# Patient Record
Sex: FEMALE | Race: WHITE | NOT HISPANIC OR LATINO | Employment: FULL TIME | URBAN - METROPOLITAN AREA
[De-identification: names, ages, dates, MRNs, and addresses within clinical notes are randomized per-mention and may not be internally consistent; named-entity substitution may affect disease eponyms.]

---

## 2018-02-23 ENCOUNTER — OFFICE VISIT (OUTPATIENT)
Dept: URGENT CARE | Facility: CLINIC | Age: 23
End: 2018-02-23
Payer: COMMERCIAL

## 2018-02-23 VITALS
WEIGHT: 165 LBS | BODY MASS INDEX: 30.36 KG/M2 | DIASTOLIC BLOOD PRESSURE: 80 MMHG | TEMPERATURE: 99.9 F | SYSTOLIC BLOOD PRESSURE: 136 MMHG | HEIGHT: 62 IN | HEART RATE: 70 BPM | OXYGEN SATURATION: 100 % | RESPIRATION RATE: 18 BRPM

## 2018-02-23 DIAGNOSIS — M25.512 ACUTE PAIN OF LEFT SHOULDER: Primary | ICD-10-CM

## 2018-02-23 PROCEDURE — 99203 OFFICE O/P NEW LOW 30 MIN: CPT | Performed by: PHYSICIAN ASSISTANT

## 2018-02-23 NOTE — PROGRESS NOTES
Assessment/Plan:      Diagnoses and all orders for this visit:    Acute pain of left shoulder  -     Ambulatory referral to Orthopedic Surgery; Future        Patient Instructions   Up to 2 tablets of aleve twice a day  Alternate ice and heat application at least 4 times a day  Gentle stretching and massage    Subjective:    Chief Complaint   Patient presents with    Shoulder Pain     started 1 month ago      Patient ID: Austyn Vasques is a 25 y o  female  Shoulder Pain    The pain is present in the left shoulder  This is a new problem  Episode onset: 1 mo  There has been no history of extremity trauma  The problem occurs constantly  The problem has been gradually worsening  The pain is moderate  Pertinent negatives include no fever, inability to bear weight, itching, joint locking, joint swelling, limited range of motion, numbness, stiffness or tingling  The symptoms are aggravated by lying down  She has tried acetaminophen for the symptoms  The treatment provided mild relief  Family history does not include gout or rheumatoid arthritis  There is no history of diabetes, gout, osteoarthritis or rheumatoid arthritis  Review of Systems   Constitutional: Negative for fever  Respiratory: Negative  Cardiovascular: Negative  Musculoskeletal: Positive for arthralgias and myalgias  Negative for back pain, gait problem, gout, joint swelling, neck pain, neck stiffness and stiffness  Skin: Negative  Negative for itching  Neurological: Negative for tingling and numbness  Objective:    /80 (BP Location: Right arm, Patient Position: Sitting, Cuff Size: Standard)   Pulse 70   Temp 99 9 °F (37 7 °C) (Tympanic)   Resp 18   Ht 5' 2" (1 575 m)   Wt 74 8 kg (165 lb)   LMP 01/30/2018 (Approximate)   SpO2 100%   BMI 30 18 kg/m²      Physical Exam   Constitutional: She is oriented to person, place, and time  She appears well-developed and well-nourished  No distress     Cardiovascular: Normal rate, regular rhythm, normal heart sounds and intact distal pulses  Exam reveals no gallop and no friction rub  No murmur heard  Pulmonary/Chest: Effort normal and breath sounds normal  No respiratory distress  She has no wheezes  She has no rales  She exhibits no tenderness  Abdominal: Soft  Bowel sounds are normal  She exhibits no distension and no mass  There is no tenderness  There is no rebound and no guarding  Musculoskeletal:        Left shoulder: She exhibits pain  She exhibits normal range of motion, no tenderness, no bony tenderness, no swelling, no effusion, no crepitus, no deformity, no laceration, no spasm, normal pulse and normal strength  Neurological: She is alert and oriented to person, place, and time  She displays normal reflexes  No cranial nerve deficit  She exhibits normal muscle tone   Coordination normal

## 2018-02-23 NOTE — PATIENT INSTRUCTIONS
Up to 2 tablets of aleve twice a day  Alternate ice and heat application at least 4 times a day  Gentle stretching and massage

## 2020-08-07 ENCOUNTER — OFFICE VISIT (OUTPATIENT)
Dept: URGENT CARE | Facility: CLINIC | Age: 25
End: 2020-08-07
Payer: COMMERCIAL

## 2020-08-07 VITALS
HEART RATE: 78 BPM | BODY MASS INDEX: 35.88 KG/M2 | HEIGHT: 62 IN | WEIGHT: 195 LBS | SYSTOLIC BLOOD PRESSURE: 129 MMHG | OXYGEN SATURATION: 99 % | DIASTOLIC BLOOD PRESSURE: 80 MMHG | TEMPERATURE: 99 F | RESPIRATION RATE: 16 BRPM

## 2020-08-07 DIAGNOSIS — L03.011 PARONYCHIA OF RIGHT INDEX FINGER: Primary | ICD-10-CM

## 2020-08-07 PROCEDURE — 99212 OFFICE O/P EST SF 10 MIN: CPT | Performed by: EMERGENCY MEDICINE

## 2020-08-07 RX ORDER — NORGESTREL-ETHINYL ESTRADIOL 0.3-0.03MG
1 TABLET ORAL DAILY
COMMUNITY
Start: 2020-05-27

## 2020-08-07 RX ORDER — VENLAFAXINE HYDROCHLORIDE 75 MG/1
75 CAPSULE, EXTENDED RELEASE ORAL DAILY
COMMUNITY
Start: 2020-05-27

## 2020-08-07 RX ORDER — CLINDAMYCIN HYDROCHLORIDE 150 MG/1
150 CAPSULE ORAL EVERY 8 HOURS SCHEDULED
Qty: 21 CAPSULE | Refills: 0 | Status: SHIPPED | OUTPATIENT
Start: 2020-08-07 | End: 2020-08-14

## 2020-08-07 NOTE — PROGRESS NOTES
[facility]        NAME: Asher Bowser is a 22 y o  female  : 1995    MRN: [id number]  DATE: 2020  TIME: 9:02 AM    Assessment and Plan   Paronychia of right index finger [L03 011]  1  Paronychia of right index finger  clindamycin (CLEOCIN) 150 mg capsule         Patient Instructions     Patient Instructions   Paronychia   WHAT YOU NEED TO KNOW:   Paronychia is an infection of your nail fold caused by bacteria or a fungus  The nail fold is the skin around your nail  Paronychia may happen suddenly and last for 6 weeks or longer  You may have paronychia on more than 1 finger or toe  DISCHARGE INSTRUCTIONS:   Medicines:   · Td vaccine  is a booster shot used to help prevent tetanus and diphtheria  The Td booster may be given to adolescents and adults every 10 years or for certain wounds and injuries  · Antibiotics: This medicine will help fight or prevent an infection  It may be given as a pill, cream, or ointment  · Steroids: This medicine will help decrease inflammation  It may be given as a pill, cream, or ointment  · Antifungal medicine: This medicine helps kill fungus that may be causing your infection  It may be given as a cream or ointment  · NSAIDs:  These medicines decrease pain and swelling  NSAIDs are available without a doctor's order  Ask your healthcare provider which medicine is right for you  Ask how much to take and when to take it  Take as directed  NSAIDs can cause stomach bleeding and kidney problems if not taken correctly  · Take your medicine as directed  Contact your healthcare provider if you think your medicine is not helping or if you have side effects  Tell him of her if you are allergic to any medicine  Keep a list of the medicines, vitamins, and herbs you take  Include the amounts, and when and why you take them  Bring the list or the pill bottles to follow-up visits  Carry your medicine list with you in case of an emergency    Follow up with your healthcare provider as directed:  Write down your questions so you remember to ask them during your visits  Self-care:   · Soak your nail:  Soak your nail in a mixture of equal parts vinegar and water 3 or 4 times each day  This will help decrease inflammation  · Apply a warm compress:  Soak a washcloth in warm water and place it on your nail  This will help decrease inflammation  · Elevate:  Raise your nail above the level of your heart as often as you can  This will help decrease swelling and pain  Prop your nail on pillows or blankets to keep it elevated comfortably  · Use lotion:  Apply lotion after you wash your hands  This will prevent your skin from becoming too dry  Prevent paronychia:   · Avoid chemicals and allergens that may harm your skin and nails  This includes soaps, laundry detergents, and nail products  · Keep your nails clean and dry  Avoid soaking your nails in water  Use cotton-lined rubber gloves or wear 2 rubber gloves if you work with food or water  The gloves will help protect your nail folds  · Keep your nails short  Do not bite your nails, pick at your hangnails, suck your fingers, or wear fake nails  Bring your own nail tools when you go to the nail salon  Contact your healthcare provider if:   · Your nail becomes loose, deformed, or falls off  · You have a large abscess on your nail  · You have questions or concerns about your condition or care  Return to the emergency department if:   · You have severe nail pain  · The inflammation spreads to your hand or arm  © 2017 [address] Information is for End User's use only and may not be sold, redistributed or otherwise used for commercial purposes  All illustrations and images included in CareNotes® are the copyrighted property of [employer]  or [employer]  The above information is an  only   It is not intended as medical advice for individual conditions or treatments  Talk to your doctor, nurse or pharmacist before following any medical regimen to see if it is safe and effective for you  Follow up with PCP in 3-5 days  Proceed to  ER if symptoms worsen  Chief Complaint     Chief Complaint   Patient presents with   Trisha Shaffer     was picking at cuticle on right 2nd finger and now it is red and swollen around the nailbed         History of Present Illness       Patient complains of increasing redness swelling and pain adjacent to right index finger nail over the past 2 days  Onset after she was picking at her cuticle  Review of Systems   Review of Systems   Constitutional: Negative for activity change, chills and fever  Musculoskeletal: Negative for arthralgias, back pain, joint swelling, myalgias, neck pain and neck stiffness  Skin: Positive for color change and wound  Neurological: Negative for dizziness and headaches  Current Medications       Current Outpatient Medications:     Cryselle-28 0 3-30 MG-MCG per tablet, Take 1 tablet by mouth daily, Disp: , Rfl:     venlafaxine (EFFEXOR-XR) 75 mg 24 hr capsule, Take 75 mg by mouth daily, Disp: , Rfl:     clindamycin (CLEOCIN) 150 mg capsule, Take 1 capsule (150 mg total) by mouth every 8 (eight) hours for 7 days, Disp: 21 capsule, Rfl: 0    Current Allergies     Allergies as of 08/07/2020    (No Known Allergies)            The following portions of the patient's history were reviewed and updated as appropriate: allergies, current medications, past family history, past medical history, past social history, past surgical history and problem list      Past Medical History:   Diagnosis Date    Known health problems: none        Past Surgical History:   Procedure Laterality Date    NO PAST SURGERIES         Family History   Problem Relation Age of Onset    No Known Problems Mother     No Known Problems Father          Medications have been verified          Objective   /80 Pulse 78   Temp 99 °F (37 2 °C) (Tympanic)   Resp 16   Ht 5' 2" (1 575 m)   Wt 88 5 kg (195 lb)   LMP 07/28/2020   SpO2 99%   BMI 35 67 kg/m²        Physical Exam     Physical Exam   Constitutional: She is oriented to person, place, and time  She appears well-developed  HENT:   Head: Normocephalic and atraumatic  Neck: Normal range of motion  Neck supple  Musculoskeletal:         General: No tenderness  Neurological: She is alert and oriented to person, place, and time  Skin: Skin is warm and dry  No rash noted  There is erythema  Tender mass with erythema lateral to right index finger nail margin, no I and D able lesion at present  Psychiatric: Her behavior is normal  Judgment and thought content normal    Nursing note and vitals reviewed

## 2020-08-07 NOTE — PATIENT INSTRUCTIONS
Paronychia   WHAT YOU NEED TO KNOW:   Paronychia is an infection of your nail fold caused by bacteria or a fungus  The nail fold is the skin around your nail  Paronychia may happen suddenly and last for 6 weeks or longer  You may have paronychia on more than 1 finger or toe  DISCHARGE INSTRUCTIONS:   Medicines:   · Td vaccine  is a booster shot used to help prevent tetanus and diphtheria  The Td booster may be given to adolescents and adults every 10 years or for certain wounds and injuries  · Antibiotics: This medicine will help fight or prevent an infection  It may be given as a pill, cream, or ointment  · Steroids: This medicine will help decrease inflammation  It may be given as a pill, cream, or ointment  · Antifungal medicine: This medicine helps kill fungus that may be causing your infection  It may be given as a cream or ointment  · NSAIDs:  These medicines decrease pain and swelling  NSAIDs are available without a doctor's order  Ask your healthcare provider which medicine is right for you  Ask how much to take and when to take it  Take as directed  NSAIDs can cause stomach bleeding and kidney problems if not taken correctly  · Take your medicine as directed  Contact your healthcare provider if you think your medicine is not helping or if you have side effects  Tell him of her if you are allergic to any medicine  Keep a list of the medicines, vitamins, and herbs you take  Include the amounts, and when and why you take them  Bring the list or the pill bottles to follow-up visits  Carry your medicine list with you in case of an emergency  Follow up with your healthcare provider as directed:  Write down your questions so you remember to ask them during your visits  Self-care:   · Soak your nail:  Soak your nail in a mixture of equal parts vinegar and water 3 or 4 times each day  This will help decrease inflammation      · Apply a warm compress:  Soak a washcloth in warm water and place it on your nail  This will help decrease inflammation  · Elevate:  Raise your nail above the level of your heart as often as you can  This will help decrease swelling and pain  Prop your nail on pillows or blankets to keep it elevated comfortably  · Use lotion:  Apply lotion after you wash your hands  This will prevent your skin from becoming too dry  Prevent paronychia:   · Avoid chemicals and allergens that may harm your skin and nails  This includes soaps, laundry detergents, and nail products  · Keep your nails clean and dry  Avoid soaking your nails in water  Use cotton-lined rubber gloves or wear 2 rubber gloves if you work with food or water  The gloves will help protect your nail folds  · Keep your nails short  Do not bite your nails, pick at your hangnails, suck your fingers, or wear fake nails  Bring your own nail tools when you go to the nail salon  Contact your healthcare provider if:   · Your nail becomes loose, deformed, or falls off  · You have a large abscess on your nail  · You have questions or concerns about your condition or care  Return to the emergency department if:   · You have severe nail pain  · The inflammation spreads to your hand or arm  © 2017 [facility] Information is for End User's use only and may not be sold, redistributed or otherwise used for commercial purposes  All illustrations and images included in CareNotes® are the copyrighted property of [employer]  or [employer]  The above information is an  only  It is not intended as medical advice for individual conditions or treatments  Talk to your doctor, nurse or pharmacist before following any medical regimen to see if it is safe and effective for you

## 2020-09-22 ENCOUNTER — OFFICE VISIT (OUTPATIENT)
Dept: URGENT CARE | Facility: CLINIC | Age: 25
End: 2020-09-22
Payer: COMMERCIAL

## 2020-09-22 VITALS
HEART RATE: 75 BPM | RESPIRATION RATE: 18 BRPM | HEIGHT: 62 IN | OXYGEN SATURATION: 98 % | BODY MASS INDEX: 34.96 KG/M2 | WEIGHT: 190 LBS | TEMPERATURE: 98 F

## 2020-09-22 DIAGNOSIS — J06.9 VIRAL URI: Primary | ICD-10-CM

## 2020-09-22 LAB — S PYO AG THROAT QL: NEGATIVE

## 2020-09-22 PROCEDURE — 87880 STREP A ASSAY W/OPTIC: CPT | Performed by: PHYSICIAN ASSISTANT

## 2020-09-22 PROCEDURE — U0003 INFECTIOUS AGENT DETECTION BY NUCLEIC ACID (DNA OR RNA); SEVERE ACUTE RESPIRATORY SYNDROME CORONAVIRUS 2 (SARS-COV-2) (CORONAVIRUS DISEASE [COVID-19]), AMPLIFIED PROBE TECHNIQUE, MAKING USE OF HIGH THROUGHPUT TECHNOLOGIES AS DESCRIBED BY CMS-2020-01-R: HCPCS | Performed by: PHYSICIAN ASSISTANT

## 2020-09-22 PROCEDURE — 87070 CULTURE OTHR SPECIMN AEROBIC: CPT | Performed by: PHYSICIAN ASSISTANT

## 2020-09-22 PROCEDURE — 99213 OFFICE O/P EST LOW 20 MIN: CPT | Performed by: PHYSICIAN ASSISTANT

## 2020-09-22 NOTE — LETTER
September 22, 2020     Patient: Asher Bowser   YOB: 1995   Date of Visit: 9/22/2020       To Whom It May Concern: It is my medical opinion that Asher Bowser Should remain out of work for 10 days since symptom onset or 24 hours fever free without the use of fever reducing drugs, whichever is longer AND overall general improvement in symptoms OR 14 days since last exposure OR negative results  If you have any questions or concerns, please don't hesitate to call           Sincerely,        Sally Camacho PA-C

## 2020-09-22 NOTE — PATIENT INSTRUCTIONS
Covid 19 results will return in a 3-5 days  We will call you with both negative or positive results  Prophylactically self quarantine  Department of health's newest recommendations state patient should self quarantine for 10 days since symptom onset or 24 hours fever free without the use of fever reducing drugs (Tylenol and ibuprofen), whichever is longer AND overall improvement of symptoms  Drink lots of fluids to maintain hydration  Do not touch your face, wash hands often, and practice social distancing  Call your PCP if you have any questions or concerns  Go to ER if having severe symptoms such as chest pain, shortness of breath, or fever that is not responding to antipyretics  Novel Coronavirus   WHAT YOU NEED TO KNOW:   The nCoV is a new strain (type) of coronavirus that can cause severe respiratory (lung) infection  DISCHARGE INSTRUCTIONS:   Call your local emergency number (911 in the [address] [place] [address],[address]) for any of the following:  Tell the  you may have nCoV  This will help anyone in the ambulance stay safe, and to call ahead to the hospital   · You have sudden shortness of breath  · You have a fast heartbeat or chest pain  Return to the emergency department if:   · You feel so dizzy that you have trouble standing up  · Your lips and fingernails turn blue  Call your doctor if:   · You have a fever over 102ºF (39ºC)  · Your sore throat gets worse or you see white or yellow spots in your throat  · Your symptoms get worse after 3 to 5 days or your cold is not better in 14 days  · You have a rash anywhere on your skin  · You have large, tender lumps in your neck  · You have thick, green, or yellow drainage from your nose  · You cough up thick yellow, green, or bloody mucus  · You are vomiting for more than 24 hours and cannot keep fluids down  · You have a bad earache  · You have questions or concerns about your condition or care  Medicines:   You may need any of the following:  · Decongestants  help reduce nasal congestion and help you breathe more easily  If you take decongestant pills, they may make you feel restless or cause problems with your sleep  Do not use decongestant sprays for more than a few days  · Cough suppressants  help reduce coughing  Ask your healthcare provider which type of cough medicine is best for you  · NSAIDs , such as ibuprofen, help decrease swelling, pain, and fever  NSAIDs can cause stomach bleeding or kidney problems in certain people  If you take blood thinner medicine, always ask your healthcare provider if NSAIDs are safe for you  Always read the medicine label and follow directions  · Acetaminophen  decreases pain and fever  It is available without a doctor's order  Ask how much to take and how often to take it  Follow directions  Read the labels of all other medicines you are using to see if they also contain acetaminophen, or ask your doctor or pharmacist  Acetaminophen can cause liver damage if not taken correctly  Do not use more than 4 grams (4,000 milligrams) total of acetaminophen in one day  · Take your medicine as directed  Contact your healthcare provider if you think your medicine is not helping or if you have side effects  Tell him or her if you are allergic to any medicine  Keep a list of the medicines, vitamins, and herbs you take  Include the amounts, and when and why you take them  Bring the list or the pill bottles to follow-up visits  Carry your medicine list with you in case of an emergency  Help relieve mild symptoms:   · Drink more liquids as directed  Liquids will help thin and loosen mucus so you can cough it up  Liquids will also help prevent dehydration  Liquids that help prevent dehydration include water, fruit juice, and broth  Do not drink liquids that contain caffeine  Caffeine can increase your risk for dehydration  Ask your healthcare provider how much liquid to drink each day      · Soothe a sore throat  Gargle with warm salt water  This helps your sore throat feel better  Make salt water by dissolving ¼ teaspoon salt in 1 cup warm water  You may also suck on hard candy or throat lozenges  You may use a sore throat spray  · Use a humidifier or vaporizer  Use a cool mist humidifier or a vaporizer to increase air moisture in your home  This may make it easier for you to breathe and help decrease your cough  · Use saline nasal drops as directed  These help relieve congestion  · Apply petroleum-based jelly around the outside of your nostrils  This can decrease irritation from blowing your nose  · Do not smoke  Nicotine and other chemicals in cigarettes and cigars can make your symptoms worse  They can also cause infections such as bronchitis or pneumonia  Ask your healthcare provider for information if you currently smoke and need help to quit  E-cigarettes or smokeless tobacco still contain nicotine  Talk to your healthcare provider before you use these products  Prevent the spread of nCoV:  If you are around anyone who has nCoV, the following can help you protect you from infection  Have the person follow the guidelines to prevent infecting others:  · Limit close contact with others  Anyone with nCoV should stay in a different room, or sleep in a separate bed  Others need to stay at least 6 feet (2 meters) away  The person should only go out of the house for medical appointments  He or she should always call the office of any healthcare provider  The provider will need to prepare to keep others safe  · Wear a medical mask when around others  You can wear a medical mask or have the person wear one  A medical mask will help prevent nCoV from spreading  · Cover your mouth and nose to sneeze or cough  Everyone should always cover a sneeze or cough  Use a tissue that covers the mouth and nose  Use the bend of our arm if a tissue is not available   Throw the tissue away in a trash can right away  Then wash your hands well with soap and water  Use hand  that contains alcohol if you cannot wash your hands  · Wash your hands often  You and everyone in your home must wash your hands throughout the day  Use soap and water  Use germ-killing gel if soap and water are not available  A person with nCoV should not touch his or her eyes or mouth without washing his or her hands first          · Do not share items  Do not share dishes, towels, or other items with anyone  Always wash items after a person who has nCoV uses them  · Clean surfaces often  Use household  or bleach diluted with water to clean counters, doorknobs, toilet seats, and other surfaces  · Do not handle live animals  The nCoV may be spread to animals, including pets  Until more is known, it is best for a person with nCoV not to touch, play with, or handle live animals  Follow up with your doctor as directed:  Write down your questions so you remember to ask them during your visits  © Copyright [address] Information is for End User's use only and may not be sold, redistributed or otherwise used for commercial purposes  All illustrations and images included in CareNotes® are the copyrighted property of A [employer] A M [employer] Inc  or [address] [place], [place] [address]pa[facility]  The above information is an  only  It is not intended as medical advice for individual conditions or treatments  Talk to your doctor, nurse or pharmacist before following any medical regimen to see if it is safe and effective for you

## 2020-09-22 NOTE — PROGRESS NOTES
[address]        NAME: Marcy Harris is a 26 y o  female  : 1995    MRN: [id number]  DATE: 2020  TIME: 9:25 AM    Assessment and Plan   Viral URI [J06 9]  1  Viral URI  Novel Coronavirus (COVID-19), PCR [facility] - Office Collection    POCT rapid strepA    Throat culture           Patient evaluated and tested in COVID-19 designated room  Patient Instructions   Over-the-counter Zyrtec and Flonase  Covid 19 results will return in a 3-5 days  We will call you with both negative or positive results  Prophylactically self quarantine  Department of health's newest recommendations state patient should self quarantine for 10 days since symptom onset or 24 hours fever free without the use of fever reducing drugs (Tylenol and ibuprofen), whichever is longer AND overall improvement of symptoms  Drink lots of fluids to maintain hydration  Do not touch your face, wash hands often, and practice social distancing  Call your PCP if you have any questions or concerns  Go to ER if having severe symptoms such as chest pain, shortness of breath, or fever that is not responding to antipyretics  Follow up with PCP in 3-5 days  Proceed to  ER if symptoms worsen  Chief Complaint     Chief Complaint   Patient presents with    COVID-19     Runny nose, sore throat starting yesterday  Denies fever, cough, SOB  No known COVID exposure  History of Present Illness       Patient is a 69-year-old female with no significant past medical history presents to the office complaining of rhinorrhea, congestion, and sore throat for 1 day  She denies fever, chills, cough, SOB, CP, difficulty breathing, dysphagia, anosmia, dysgeusia, or weakness  She has not been using anything for her symptoms  Denies contact with positive COVID-19  Review of Systems   Review of Systems   Constitutional: Negative for chills and fever  HENT: Positive for postnasal drip, rhinorrhea and sore throat  Negative for congestion  Respiratory: Negative for cough and shortness of breath  Cardiovascular: Negative for chest pain and palpitations  Gastrointestinal: Negative for abdominal pain, diarrhea, nausea and vomiting  Musculoskeletal: Negative for myalgias  Neurological: Negative for dizziness, light-headedness and headaches  Current Medications       Current Outpatient Medications:     Cryselle-28 0 3-30 MG-MCG per tablet, Take 1 tablet by mouth daily, Disp: , Rfl:     venlafaxine (EFFEXOR-XR) 75 mg 24 hr capsule, Take 75 mg by mouth daily, Disp: , Rfl:     Current Allergies     Allergies as of 09/22/2020    (No Known Allergies)            The following portions of the patient's history were reviewed and updated as appropriate: allergies, current medications, past family history, past medical history, past social history, past surgical history and problem list      Past Medical History:   Diagnosis Date    Known health problems: none        Past Surgical History:   Procedure Laterality Date    TONSILLECTOMY         Family History   Problem Relation Age of Onset    No Known Problems Mother     No Known Problems Father          Medications have been verified  Objective   Pulse 75   Temp 98 °F (36 7 °C)   Resp 18   Ht 5' 2" (1 575 m)   Wt 86 2 kg (190 lb)   LMP 09/17/2020 (Exact Date)   SpO2 98%   BMI 34 75 kg/m²        Physical Exam     Physical Exam  Vitals signs and nursing note reviewed  Constitutional:       Appearance: Normal appearance  She is well-developed  HENT:      Head: Normocephalic and atraumatic  Right Ear: Tympanic membrane, ear canal and external ear normal       Left Ear: Tympanic membrane, ear canal and external ear normal       Nose: Congestion and rhinorrhea present  Mouth/Throat:      Lips: Pink  Mouth: Mucous membranes are moist       Pharynx: Uvula midline  Posterior oropharyngeal erythema present   No pharyngeal swelling, oropharyngeal exudate or uvula swelling  Tonsils: No tonsillar exudate or tonsillar abscesses  Eyes:      General: Lids are normal       Conjunctiva/sclera: Conjunctivae normal       Pupils: Pupils are equal, round, and reactive to light  Neck:      Musculoskeletal: Neck supple  Cardiovascular:      Rate and Rhythm: Normal rate and regular rhythm  Pulses: Normal pulses  Heart sounds: Normal heart sounds  No murmur  No friction rub  No gallop  Pulmonary:      Effort: Pulmonary effort is normal       Breath sounds: Normal breath sounds  No wheezing, rhonchi or rales  Chest:      Chest wall: No tenderness  Musculoskeletal: Normal range of motion  Lymphadenopathy:      Cervical: No cervical adenopathy  Skin:     General: Skin is warm and dry  Capillary Refill: Capillary refill takes less than 2 seconds  Neurological:      Mental Status: She is alert  Rapid strep negative

## 2020-09-23 LAB — SARS-COV-2 RNA SPEC QL NAA+PROBE: NOT DETECTED

## 2020-09-24 LAB — BACTERIA THROAT CULT: NORMAL

## 2022-12-02 ENCOUNTER — OFFICE VISIT (OUTPATIENT)
Dept: URGENT CARE | Facility: CLINIC | Age: 27
End: 2022-12-02

## 2022-12-02 VITALS
HEART RATE: 98 BPM | SYSTOLIC BLOOD PRESSURE: 137 MMHG | TEMPERATURE: 97.1 F | WEIGHT: 200 LBS | HEIGHT: 62 IN | OXYGEN SATURATION: 98 % | BODY MASS INDEX: 36.8 KG/M2 | DIASTOLIC BLOOD PRESSURE: 79 MMHG | RESPIRATION RATE: 18 BRPM

## 2022-12-02 DIAGNOSIS — U07.1 COVID-19: Primary | ICD-10-CM

## 2022-12-02 LAB
SARS-COV-2 AG UPPER RESP QL IA: POSITIVE
VALID CONTROL: ABNORMAL

## 2022-12-02 NOTE — PROGRESS NOTES
[address]        NAME: Janalyn Paget is a 33 y o  female  : 1995    MRN: [id number]  DATE: 2022  TIME: 12:39 PM    Assessment and Plan   COVID-19 [U07 1]  1  COVID-19  Poct Covid 19 Rapid Antigen Test            Patient Instructions     Patient Instructions     Take an expectorant - guaifenesin should be the only ingredient - during the day, and the cough suppressant (ex  Robitussin DM or Tessalon) if needed at night only  Take Zinc 50 mg every 12 hours for the next week  You should also take Quercetin 500 mg twice daily with it  You should also take vitamin D3 5000 i u s per day for the next 1 week, and vitamin-C 1 g twice daily  You may use Flonase as discussed  You may also take a decongestant like Sudafed, unless you have hypertension or cardiac disease  If you are diabetic you should adhere strictly to your diabetic diet and monitor blood sugar closely while on prednisone and you should discontinue the prednisone if blood sugar becomes significantly elevated  Avoid nonsteroidal anti-inflammatories like Advil or Aleve while on prednisone  [address]     Your healthcare provider and/or public health staff have evaluated you and have determined that you do not need to be hospitalized at this time  At this time you can be isolated at home where you will be monitored by staff from your local or state health department  You should carefully follow the prevention and isolation steps below until a healthcare provider or local or state health department says that you can return to your normal activities  Stay home except to get medical care     People who are mildly ill with COVID-19 are able to isolate at home during their illness  You should restrict activities outside your home, except for getting medical care  Do not go to work, school, or public areas  Avoid using public transportation, ride-sharing, or taxis       Separate yourself from other people and animals in your home     People: As much as possible, you should stay in a specific room and away from other people in your home  Also, you should use a separate bathroom, if available  Animals: You should restrict contact with pets and other animals while you are sick with COVID-19, just like you would around other people  Although there have not been reports of pets or other animals becoming sick with COVID-19, it is still recommended that people sick with COVID-19 limit contact with animals until more information is known about the virus  When possible, have another member of your household care for your animals while you are sick  If you are sick with COVID-19, avoid contact with your pet, including petting, snuggling, being kissed or licked, and sharing food  If you must care for your pet or be around animals while you are sick, wash your hands before and after you interact with pets and wear a facemask  See COVID-19 and Animals for more information  Call ahead before visiting your doctor     If you have a medical appointment, call the healthcare provider and tell them that you have or may have COVID-19  This will help the healthcare provider's office take steps to keep other people from getting infected or exposed  Wear a facemask     You should wear a facemask when you are around other people (e g , sharing a room or vehicle) or pets and before you enter a healthcare provider's office  If you are not able to wear a facemask (for example, because it causes trouble breathing), then people who live with you should not stay in the same room with you, or they should wear a facemask if they enter your room  Cover your coughs and sneezes     Cover your mouth and nose with a tissue when you cough or sneeze  Throw used tissues in a lined trash can   Immediately wash your hands with soap and water for at least 20 seconds or, if soap and water are not available, clean your hands with an alcohol-based hand  that contains at least 60% alcohol  Clean your hands often     Wash your hands often with soap and water for at least 20 seconds, especially after blowing your nose, coughing, or sneezing; going to the bathroom; and before eating or preparing food  If soap and water are not readily available, use an alcohol-based hand  with at least 60% alcohol, covering all surfaces of your hands and rubbing them together until they feel dry  Soap and water are the best option if hands are visibly dirty  Avoid touching your eyes, nose, and mouth with unwashed hands  Avoid sharing personal household items     You should not share dishes, drinking glasses, cups, eating utensils, towels, or bedding with other people or pets in your home  After using these items, they should be washed thoroughly with soap and water  Clean all “high-touch” surfaces everyday     High touch surfaces include counters, tabletops, doorknobs, bathroom fixtures, toilets, phones, keyboards, tablets, and bedside tables  Also, clean any surfaces that may have blood, stool, or body fluids on them  Use a household cleaning spray or wipe, according to the label instructions  Labels contain instructions for safe and effective use of the cleaning product including precautions you should take when applying the product, such as wearing gloves and making sure you have good ventilation during use of the product  Monitor your symptoms     Seek prompt medical attention if your illness is worsening (e g , difficulty breathing)  Before seeking care, call your healthcare provider and tell them that you have, or are being evaluated for, COVID-19  Put on a facemask before you enter the facility  These steps will help the healthcare provider's office to keep other people in the office or waiting room from getting infected or exposed  Ask your healthcare provider to call the local or [facility] health department   Persons who are placed under active monitoring or facilitated self-monitoring should follow instructions provided by their local health department or occupational health professionals, as appropriate  If you have a medical emergency and need to call 911, notify the dispatch personnel that you have, or are being evaluated for COVID-19  If possible, put on a facemask before emergency medical services arrive  Discontinuing home isolation     Patients with confirmed COVID-19 should remain under home isolation precautions until the risk of secondary transmission to others is thought to be low  The decision to discontinue home isolation precautions should be made on a case-by-case basis, in consultation with healthcare providers and [facility] and local health departments  Source: [employer]        Proceed to ER if symptoms worsen          Follow up with PCP in 3-5 days  Proceed to  ER if symptoms worsen  Chief Complaint     Chief Complaint   Patient presents with   • Cold Like Symptoms     Sore throat ,runny nose, headache and congestion  Unsure of fever, no thermometer  Fatigue  History of Present Illness       Patient complains of sore throat, cough, congestion, headaches for the past 2 days  Review of Systems   Review of Systems   Constitutional: Negative for appetite change, chills, fatigue and fever  HENT: Positive for congestion, rhinorrhea, sinus pressure and sore throat  Negative for trouble swallowing and voice change  Respiratory: Positive for cough  Negative for chest tightness, shortness of breath and wheezing  Cardiovascular: Negative for chest pain  Gastrointestinal: Negative for nausea  Musculoskeletal: Negative for myalgias           Current Medications       Current Outpatient Medications:   •  Cryselle-28 0 3-30 MG-MCG per tablet, Take 1 tablet by mouth daily, Disp: , Rfl:   •  venlafaxine (EFFEXOR-XR) 75 mg 24 hr capsule, Take 75 mg by mouth daily, Disp: , Rfl:     Current Allergies     Allergies as of 12/02/2022   • (No Known Allergies)            The following portions of the patient's history were reviewed and updated as appropriate: allergies, current medications, past family history, past medical history, past social history, past surgical history and problem list      Past Medical History:   Diagnosis Date   • Known health problems: none        Past Surgical History:   Procedure Laterality Date   • TONSILLECTOMY         Family History   Problem Relation Age of Onset   • No Known Problems Mother    • No Known Problems Father          Medications have been verified  Objective   /79   Pulse 98   Temp (!) 97 1 °F (36 2 °C)   Resp 18   Ht 5' 2" (1 575 m)   Wt 90 7 kg (200 lb)   SpO2 98%   BMI 36 58 kg/m²        Physical Exam     Physical Exam  Vitals and nursing note reviewed  Constitutional:       General: She is not in acute distress  Appearance: She is well-developed and well-nourished  She is not ill-appearing or toxic-appearing  HENT:      Head: Normocephalic and atraumatic  Nose: Mucosal edema and congestion present  Mouth/Throat:      Pharynx: Posterior oropharyngeal erythema present  No oropharyngeal exudate  Tonsils: No tonsillar abscesses  Comments: Nasal mucosa congested, oropharynx mildly erythematous  Cardiovascular:      Rate and Rhythm: Normal rate and regular rhythm  Pulmonary:      Effort: Pulmonary effort is normal  No respiratory distress  Breath sounds: No wheezing or rales  Musculoskeletal:      Cervical back: Neck supple  Skin:     General: Skin is warm and dry  Neurological:      Mental Status: She is alert and oriented to person, place, and time  Psychiatric:         Mood and Affect: Mood and affect normal          Behavior: Behavior normal          Thought Content:  Thought content normal          Judgment: Judgment normal

## 2022-12-02 NOTE — PATIENT INSTRUCTIONS
Take an expectorant - guaifenesin should be the only ingredient - during the day, and the cough suppressant (ex  Robitussin DM or Tessalon) if needed at night only  Take Zinc 50 mg every 12 hours for the next week  You should also take Quercetin 500 mg twice daily with it  You should also take vitamin D3 5000 i u s per day for the next 1 week, and vitamin-C 1 g twice daily  You may use Flonase as discussed  You may also take a decongestant like Sudafed, unless you have hypertension or cardiac disease  If you are diabetic you should adhere strictly to your diabetic diet and monitor blood sugar closely while on prednisone and you should discontinue the prednisone if blood sugar becomes significantly elevated  Avoid nonsteroidal anti-inflammatories like Advil or Aleve while on prednisone  [address]     Your healthcare provider and/or public health staff have evaluated you and have determined that you do not need to be hospitalized at this time  At this time you can be isolated at home where you will be monitored by staff from your local or state health department  You should carefully follow the prevention and isolation steps below until a healthcare provider or local or state health department says that you can return to your normal activities  Stay home except to get medical care     People who are mildly ill with COVID-19 are able to isolate at home during their illness  You should restrict activities outside your home, except for getting medical care  Do not go to work, school, or public areas  Avoid using public transportation, ride-sharing, or taxis  Separate yourself from other people and animals in your home     People: As much as possible, you should stay in a specific room and away from other people in your home  Also, you should use a separate bathroom, if available  Animals:  You should restrict contact with pets and other animals while you are sick with COVID-19, just like you would around other people  Although there have not been reports of pets or other animals becoming sick with COVID-19, it is still recommended that people sick with COVID-19 limit contact with animals until more information is known about the virus  When possible, have another member of your household care for your animals while you are sick  If you are sick with COVID-19, avoid contact with your pet, including petting, snuggling, being kissed or licked, and sharing food  If you must care for your pet or be around animals while you are sick, wash your hands before and after you interact with pets and wear a facemask  See COVID-19 and Animals for more information  Call ahead before visiting your doctor     If you have a medical appointment, call the healthcare provider and tell them that you have or may have COVID-19  This will help the healthcare provider's office take steps to keep other people from getting infected or exposed  Wear a facemask     You should wear a facemask when you are around other people (e g , sharing a room or vehicle) or pets and before you enter a healthcare provider's office  If you are not able to wear a facemask (for example, because it causes trouble breathing), then people who live with you should not stay in the same room with you, or they should wear a facemask if they enter your room  Cover your coughs and sneezes     Cover your mouth and nose with a tissue when you cough or sneeze  Throw used tissues in a lined trash can  Immediately wash your hands with soap and water for at least 20 seconds or, if soap and water are not available, clean your hands with an alcohol-based hand  that contains at least 60% alcohol  Clean your hands often     Wash your hands often with soap and water for at least 20 seconds, especially after blowing your nose, coughing, or sneezing; going to the bathroom; and before eating or preparing food   If soap and water are not readily available, use an alcohol-based hand  with at least 60% alcohol, covering all surfaces of your hands and rubbing them together until they feel dry  Soap and water are the best option if hands are visibly dirty  Avoid touching your eyes, nose, and mouth with unwashed hands  Avoid sharing personal household items     You should not share dishes, drinking glasses, cups, eating utensils, towels, or bedding with other people or pets in your home  After using these items, they should be washed thoroughly with soap and water  Clean all “high-touch” surfaces everyday     High touch surfaces include counters, tabletops, doorknobs, bathroom fixtures, toilets, phones, keyboards, tablets, and bedside tables  Also, clean any surfaces that may have blood, stool, or body fluids on them  Use a household cleaning spray or wipe, according to the label instructions  Labels contain instructions for safe and effective use of the cleaning product including precautions you should take when applying the product, such as wearing gloves and making sure you have good ventilation during use of the product  Monitor your symptoms     Seek prompt medical attention if your illness is worsening (e g , difficulty breathing)  Before seeking care, call your healthcare provider and tell them that you have, or are being evaluated for, COVID-19  Put on a facemask before you enter the facility  These steps will help the healthcare provider's office to keep other people in the office or waiting room from getting infected or exposed  Ask your healthcare provider to call the local or state health department  Persons who are placed under active monitoring or facilitated self-monitoring should follow instructions provided by their local health department or occupational health professionals, as appropriate    If you have a medical emergency and need to call 911, notify the dispatch personnel that you have, or are being evaluated for COVID-19  If possible, put on a facemask before emergency medical services arrive  Discontinuing home isolation     Patients with confirmed COVID-19 should remain under home isolation precautions until the risk of secondary transmission to others is thought to be low  The decision to discontinue home isolation precautions should be made on a case-by-case basis, in consultation with healthcare providers and state and local health departments       Source: [employer]        Proceed to ER if symptoms worsen

## 2022-12-02 NOTE — LETTER
December 2, 2022     Patient: Dimas Johnson   YOB: 1995   Date of Visit: 12/2/2022       To Whom it May Concern:    Dimas Johnson was seen in my clinic on 12/2/2022  She should remain out of work/school for 5 days since symptom onset or 24 hours fever free without the use of fever reducing drugs, whichever is longer AND overall general improvement in symptoms and must adhere to strict masking guidelines for 5 more days         If you have any questions or concerns, please don't hesitate to call           Sincerely,          Pretty Camacho MD        CC: No Recipients

## 2024-10-16 ENCOUNTER — OFFICE VISIT (OUTPATIENT)
Dept: URGENT CARE | Facility: CLINIC | Age: 29
End: 2024-10-16
Payer: COMMERCIAL

## 2024-10-16 VITALS
HEART RATE: 91 BPM | RESPIRATION RATE: 12 BRPM | DIASTOLIC BLOOD PRESSURE: 92 MMHG | OXYGEN SATURATION: 97 % | BODY MASS INDEX: 38.57 KG/M2 | WEIGHT: 209.6 LBS | HEIGHT: 62 IN | TEMPERATURE: 98.8 F | SYSTOLIC BLOOD PRESSURE: 139 MMHG

## 2024-10-16 DIAGNOSIS — J01.00 ACUTE MAXILLARY SINUSITIS, RECURRENCE NOT SPECIFIED: Primary | ICD-10-CM

## 2024-10-16 PROCEDURE — S9083 URGENT CARE CENTER GLOBAL: HCPCS | Performed by: PHYSICIAN ASSISTANT

## 2024-10-16 PROCEDURE — G0382 LEV 3 HOSP TYPE B ED VISIT: HCPCS | Performed by: PHYSICIAN ASSISTANT

## 2024-10-16 RX ORDER — LEVOTHYROXINE SODIUM 50 UG/1
50 TABLET ORAL
COMMUNITY

## 2024-10-16 RX ORDER — FLUTICASONE PROPIONATE 50 MCG
1 SPRAY, SUSPENSION (ML) NASAL DAILY
Qty: 9.9 ML | Refills: 0 | Status: SHIPPED | OUTPATIENT
Start: 2024-10-16

## 2024-10-16 NOTE — PROGRESS NOTES
"[facility]        NAME: Jazmin Malhotra is a 29 y.o. female  : 1995    MRN: [id number]  DATE: 2024  TIME: 2:01 PM    /92   Pulse 91   Temp 98.8 °F (37.1 °C)   Resp 12   Ht 5' 2\" (1.575 m)   Wt 95.1 kg (209 lb 9.6 oz)   LMP 10/10/2024   SpO2 97%   BMI 38.34 kg/m²     Assessment and Plan   Acute maxillary sinusitis, recurrence not specified [J01.00]  1. Acute maxillary sinusitis, recurrence not specified  amoxicillin-clavulanate (AUGMENTIN) 875-125 mg per tablet    fluticasone (FLONASE) 50 mcg/act nasal spray            Patient Instructions       Follow up with PCP in 3-5 days.  Proceed to  ER if symptoms worsen.    Chief Complaint     Chief Complaint   Patient presents with    Earache     Right ear pain starting yesterday.          History of Present Illness       Pt with right ear pain sinus congestion minor cough         Review of Systems   Review of Systems   Constitutional: Negative.    HENT:  Positive for congestion, sinus pressure, sinus pain and sore throat.    Eyes: Negative.    Respiratory:  Positive for cough.    Cardiovascular: Negative.    Gastrointestinal: Negative.    Endocrine: Negative.    Genitourinary: Negative.    Musculoskeletal: Negative.    Skin: Negative.    Allergic/Immunologic: Negative.    Neurological: Negative.    Hematological: Negative.    Psychiatric/Behavioral: Negative.     All other systems reviewed and are negative.        Current Medications       Current Outpatient Medications:     amoxicillin-clavulanate (AUGMENTIN) 875-125 mg per tablet, Take 1 tablet by mouth every 12 (twelve) hours for 10 days, Disp: 20 tablet, Rfl: 0    Cryselle-28 0.3-30 MG-MCG per tablet, Take 1 tablet by mouth daily, Disp: , Rfl:     fluticasone (FLONASE) 50 mcg/act nasal spray, 1 spray into each nostril daily, Disp: 9.9 mL, Rfl: 0    venlafaxine (EFFEXOR-XR) 75 mg 24 hr capsule, Take 75 mg by mouth daily, Disp: , Rfl:     levothyroxine 50 mcg tablet, Take 50 mcg " "by mouth daily before breakfast 30 minutes prior (Patient not taking: Reported on 10/16/2024), Disp: , Rfl:     Current Allergies     Allergies as of 10/16/2024    (No Known Allergies)            The following portions of the patient's history were reviewed and updated as appropriate: allergies, current medications, past family history, past medical history, past social history, past surgical history and problem list.     Past Medical History:   Diagnosis Date    Anxiety     Depression        Past Surgical History:   Procedure Laterality Date    TONSILLECTOMY      TYMPANOSTOMY TUBE PLACEMENT         Family History   Problem Relation Age of Onset    No Known Problems Mother     No Known Problems Father          Medications have been verified.        Objective   /92   Pulse 91   Temp 98.8 °F (37.1 °C)   Resp 12   Ht 5' 2\" (1.575 m)   Wt 95.1 kg (209 lb 9.6 oz)   LMP 10/10/2024   SpO2 97%   BMI 38.34 kg/m²        Physical Exam     Physical Exam  Vitals and nursing note reviewed.   Constitutional:       Appearance: Normal appearance. She is normal weight.   HENT:      Head: Normocephalic and atraumatic.      Right Ear: Tympanic membrane, ear canal and external ear normal.      Left Ear: Tympanic membrane, ear canal and external ear normal.      Nose: Congestion and rhinorrhea present.      Comments: Post nasal drip yellow d/c  max sinus tender to palp      Mouth/Throat:      Mouth: Mucous membranes are moist.      Pharynx: Oropharynx is clear. Posterior oropharyngeal erythema present.   Eyes:      Extraocular Movements: Extraocular movements intact.      Conjunctiva/sclera: Conjunctivae normal.      Pupils: Pupils are equal, round, and reactive to light.   Cardiovascular:      Rate and Rhythm: Normal rate and regular rhythm.      Pulses: Normal pulses.      Heart sounds: Normal heart sounds.   Pulmonary:      Effort: Pulmonary effort is normal.      Breath sounds: Normal breath sounds.   Musculoskeletal:   "       General: Normal range of motion.      Cervical back: Normal range of motion and neck supple.   Skin:     General: Skin is warm.      Capillary Refill: Capillary refill takes less than 2 seconds.   Neurological:      Mental Status: She is alert and oriented to person, place, and time.   Psychiatric:         Mood and Affect: Mood normal.

## 2024-12-29 ENCOUNTER — HOSPITAL ENCOUNTER (EMERGENCY)
Facility: HOSPITAL | Age: 29
Discharge: HOME | End: 2024-12-29
Attending: EMERGENCY MEDICINE | Admitting: EMERGENCY MEDICINE
Payer: COMMERCIAL

## 2024-12-29 ENCOUNTER — APPOINTMENT (EMERGENCY)
Dept: RADIOLOGY | Facility: HOSPITAL | Age: 29
End: 2024-12-29
Payer: COMMERCIAL

## 2024-12-29 VITALS
RESPIRATION RATE: 20 BRPM | WEIGHT: 211.64 LBS | HEIGHT: 62 IN | TEMPERATURE: 99.7 F | BODY MASS INDEX: 38.95 KG/M2 | OXYGEN SATURATION: 96 % | SYSTOLIC BLOOD PRESSURE: 144 MMHG | DIASTOLIC BLOOD PRESSURE: 77 MMHG | HEART RATE: 113 BPM

## 2024-12-29 DIAGNOSIS — J40 BRONCHITIS: Primary | ICD-10-CM

## 2024-12-29 LAB
FLUAV AG UPPER RESP QL IA.RAPID: NEGATIVE
FLUBV AG UPPER RESP QL IA.RAPID: NEGATIVE
SARS-COV+SARS-COV-2 AG RESP QL IA.RAPID: NEGATIVE

## 2024-12-29 PROCEDURE — 87811 SARS-COV-2 COVID19 W/OPTIC: CPT | Performed by: EMERGENCY MEDICINE

## 2024-12-29 PROCEDURE — 99284 EMERGENCY DEPT VISIT MOD MDM: CPT | Performed by: EMERGENCY MEDICINE

## 2024-12-29 PROCEDURE — 71045 X-RAY EXAM CHEST 1 VIEW: CPT

## 2024-12-29 PROCEDURE — 87804 INFLUENZA ASSAY W/OPTIC: CPT | Performed by: EMERGENCY MEDICINE

## 2024-12-29 PROCEDURE — 99283 EMERGENCY DEPT VISIT LOW MDM: CPT

## 2024-12-29 RX ORDER — ACETAMINOPHEN 325 MG/1
975 TABLET ORAL ONCE
Status: COMPLETED | OUTPATIENT
Start: 2024-12-29 | End: 2024-12-29

## 2024-12-29 RX ORDER — PREDNISONE 20 MG/1
40 TABLET ORAL ONCE
Status: COMPLETED | OUTPATIENT
Start: 2024-12-29 | End: 2024-12-29

## 2024-12-29 RX ORDER — PREDNISONE 20 MG/1
60 TABLET ORAL DAILY
Qty: 15 TABLET | Refills: 0 | Status: SHIPPED | OUTPATIENT
Start: 2024-12-29 | End: 2025-01-03

## 2024-12-29 RX ORDER — DOXYCYCLINE 100 MG/1
100 CAPSULE ORAL 2 TIMES DAILY
Qty: 14 CAPSULE | Refills: 0 | Status: SHIPPED | OUTPATIENT
Start: 2024-12-29 | End: 2025-01-05

## 2024-12-29 RX ORDER — DOXYCYCLINE 100 MG/1
100 CAPSULE ORAL ONCE
Status: COMPLETED | OUTPATIENT
Start: 2024-12-29 | End: 2024-12-29

## 2024-12-29 RX ADMIN — ACETAMINOPHEN 325MG 975 MG: 325 TABLET ORAL at 22:56

## 2024-12-29 RX ADMIN — PREDNISONE 40 MG: 20 TABLET ORAL at 22:56

## 2024-12-29 RX ADMIN — DOXYCYCLINE 100 MG: 100 CAPSULE ORAL at 22:56

## 2024-12-29 NOTE — Clinical Note
Jazmin Malhotra was seen and treated in our emergency department on 12/29/2024.                Diagnosis:     Jazmin  may return to work on return date.    She may return on this date: 12/31/2024         If you have any questions or concerns, please don't hesitate to call.      Aleksandra Santiago RN    ______________________________           _______________          _______________  Hospital Representative                              Date                                Time

## 2024-12-30 NOTE — ED PROVIDER NOTES
Time reflects when diagnosis was documented in both MDM as applicable and the Disposition within this note       Time User Action Codes Description Comment    12/29/2024 10:25 PM Alex De Souza Add [J40] Bronchitis           ED Disposition       ED Disposition   Discharge    Condition   Stable    Date/Time   Sun Dec 29, 2024 10:25 PM    Comment   Jazmin Malhotra discharge to home/self care.                   Assessment & Plan       Medical Decision Making  Patient is afebrile nontoxic well-appearing clinically and hemodynamically stable in the emergency department sinus tachycardia at initial triage was resolved when seen and evaluated in the ED. history examination consistent with upper respiratory infection and bronchitis for now we will treat with steroid course and antibiotics and advised rest and supportive care and prompt follow-up with primary physician for reevaluation further evaluation and treatment and obtain test results. return precautions and anticipatory guidance discussed.      Problems Addressed:  Bronchitis: acute illness or injury    Amount and/or Complexity of Data Reviewed  Labs: ordered. Decision-making details documented in ED Course.  Radiology: ordered and independent interpretation performed. Decision-making details documented in ED Course.    Risk  OTC drugs.  Prescription drug management.        ED Course as of 12/29/24 2247   [place] Dec 29, 2024   2229 Heart rate currently 101 pulse oximeter       Medications   acetaminophen (TYLENOL) tablet 975 mg (has no administration in time range)   predniSONE tablet 40 mg (has no administration in time range)   doxycycline hyclate (VIBRAMYCIN) capsule 100 mg (has no administration in time range)       ED Risk Strat Scores                          SBIRT 20yo+      Flowsheet Row Most Recent Value   Initial Alcohol Screen: US AUDIT-C     1. How often do you have a drink containing alcohol? 0 Filed at: 12/29/2024 2055   2. How many drinks containing  alcohol do you have on a typical day you are drinking?  0 Filed at: 12/29/2024 2055   3b. FEMALE Any Age, or MALE 65+: How often do you have 4 or more drinks on one occassion? 0 Filed at: 12/29/2024 2055   Audit-C Score 0 Filed at: 12/29/2024 2055   TAIWO: How many times in the past year have you...    Used an illegal drug or used a prescription medication for non-medical reasons? Never Filed at: 12/29/2024 2055                            History of Present Illness       Chief Complaint   Patient presents with    Flu Symptoms     She started on Friday with a cough, and a fever. She said that she gets SOB quickly. She used her boyfriends inhaler because she said that she was so SOB she couldn't get out of bed. Her boyfriend that she lives with was at urgent care and was diagnosed with Bronchitis today. She said that she has the same symptoms that he does.       Past Medical History:   Diagnosis Date    Anxiety     Depression       Past Surgical History:   Procedure Laterality Date    TONSILLECTOMY      TYMPANOSTOMY TUBE PLACEMENT        Family History   Problem Relation Age of Onset    No Known Problems Mother     No Known Problems Father       Social History     Tobacco Use    Smoking status: Never    Smokeless tobacco: Never   Vaping Use    Vaping status: Never Used   Substance Use Topics    Alcohol use: Yes     Comment: occasionally    Drug use: No      E-Cigarette/Vaping    E-Cigarette Use Never User       E-Cigarette/Vaping Substances      I have reviewed and agree with the history as documented.     Patient is a 29-year-old female presents emergency department complaint of cough and URI symptoms started yesterday significant other whom she lives with was recently seen for the same symptoms and diagnosed with bronchitis.        History provided by:  Patient  Flu Symptoms  Presenting symptoms: cough, fever and shortness of breath    Presenting symptoms: no nausea and no vomiting    Severity:  Moderate  Onset  quality:  Gradual  Duration:  2 days  Progression:  Worsening  Associated symptoms: nasal congestion        Review of Systems   Constitutional:  Positive for fever.   HENT:  Positive for congestion and postnasal drip.    Respiratory:  Positive for cough and shortness of breath.    Cardiovascular:  Negative for chest pain.   Gastrointestinal:  Negative for abdominal pain, nausea and vomiting.   All other systems reviewed and are negative.          Objective       ED Triage Vitals [12/29/24 2053]   Temperature Pulse Blood Pressure Respirations SpO2 Patient Position - Orthostatic VS   99.7 °F (37.6 °C) (!) 134 137/86 22 98 % Lying      Temp Source Heart Rate Source BP Location FiO2 (%) Pain Score    Temporal Monitor Left arm -- No Pain      Vitals      Date and Time Temp Pulse SpO2 Resp BP Pain Score FACES Pain Rating User   12/29/24 2053 99.7 °F (37.6 °C) 134 98 % 22 137/86 No Pain -- AR            Physical Exam  Vitals and nursing note reviewed.   Constitutional:       General: She is not in acute distress.     Appearance: Normal appearance.   HENT:      Head: Normocephalic and atraumatic.      Nose: Congestion and rhinorrhea present.   Eyes:      Conjunctiva/sclera: Conjunctivae normal.   Cardiovascular:      Rate and Rhythm: Normal rate and regular rhythm.   Pulmonary:      Effort: Pulmonary effort is normal. No respiratory distress.      Breath sounds: Normal breath sounds. Transmitted upper airway sounds present.   Skin:     General: Skin is dry.   Neurological:      General: No focal deficit present.      Mental Status: She is alert and oriented to person, place, and time.         Results Reviewed       Procedure Component Value Units Date/Time    FLU/COVID Rapid Antigen (30 min. TAT) - Preferred screening test in ED [[id number]]  (Normal) Collected: 12/29/24 2057    Lab Status: Final result Specimen: Nares from Nose Updated: 12/29/24 2117     SARS COV Rapid Antigen Negative     Influenza A Rapid Antigen Negative      Influenza B Rapid Antigen Negative    Narrative:      This test has been performed using the Qu[employer] Kimberly 2 FLU+SARS Antigen test under the Emergency Use Authorization (EUA). This test has been validated by the  and verified by the performing laboratory. The Kimberly uses lateral flow immunofluorescent sandwich assay to detect SARS-COV, Influenza A and Influenza B Antigen.     The Quidel Kimberly 2 SARS Antigen test does not differentiate between SARS-CoV and SARS-CoV-2.     Negative results are presumptive and may be confirmed with a molecular assay, if necessary, for patient management. Negative results do not rule out SARS-CoV-2 or influenza infection and should not be used as the sole basis for treatment or patient management decisions. A negative test result may occur if the level of antigen in a sample is below the limit of detection of this test.     Positive results are indicative of the presence of viral antigens, but do not rule out bacterial infection or co-infection with other viruses.     All test results should be used as an adjunct to clinical observations and other information available to the provider.    FOR PEDIATRIC PATIENTS - copy/paste COVID Guidelines URL to browser: [email or web address]            XR chest 1 view portable   ED Interpretation by Alex De Souza DO ([id number])   Bibasilar atelectasis no focal infiltrate          Procedures    ED Medication and Procedure Management   Prior to Admission Medications   Prescriptions Last Dose Informant Patient Reported? Taking?   Cryselle-28 0.3-30 MG-MCG per tablet   Yes No   Sig: Take 1 tablet by mouth daily   fluticasone (FLONASE) 50 mcg/act nasal spray   No No   Si spray into each nostril daily   levothyroxine 50 mcg tablet   Yes No   Sig: Take 50 mcg by mouth daily before breakfast 30 minutes prior   Patient not taking: Reported on 10/16/2024   venlafaxine (EFFEXOR-XR) 75 mg  24 hr capsule   Yes No   Sig: Take 75 mg by mouth daily      Facility-Administered Medications: None     Patient's Medications   Discharge Prescriptions    DOXYCYCLINE HYCLATE (VIBRAMYCIN) 100 MG CAPSULE    Take 1 capsule (100 mg total) by mouth 2 (two) times a day for 7 days       Start Date: 12/29/2024End Date: 1/5/2025       Order Dose: 100 mg       Quantity: 14 capsule    Refills: 0    PREDNISONE 20 MG TABLET    Take 3 tablets (60 mg total) by mouth daily for 5 days       Start Date: 12/29/2024End Date: 1/3/2025       Order Dose: 60 mg       Quantity: 15 tablet    Refills: 0     No discharge procedures on file.  ED SEPSIS DOCUMENTATION   Time reflects when diagnosis was documented in both MDM as applicable and the Disposition within this note       Time User Action Codes Description Comment    12/29/2024 10:25 PM Alex De Souza Add [J40] Bronchitis                  Alex De Souza DO  12/29/24 1776